# Patient Record
Sex: FEMALE | Race: WHITE | URBAN - METROPOLITAN AREA
[De-identification: names, ages, dates, MRNs, and addresses within clinical notes are randomized per-mention and may not be internally consistent; named-entity substitution may affect disease eponyms.]

---

## 2018-11-02 ENCOUNTER — OFFICE VISIT (OUTPATIENT)
Dept: PEDIATRICS | Facility: CLINIC | Age: 28
End: 2018-11-02
Payer: COMMERCIAL

## 2018-11-02 VITALS
TEMPERATURE: 98 F | WEIGHT: 125.3 LBS | BODY MASS INDEX: 20.14 KG/M2 | DIASTOLIC BLOOD PRESSURE: 72 MMHG | SYSTOLIC BLOOD PRESSURE: 114 MMHG | HEART RATE: 58 BPM | OXYGEN SATURATION: 100 % | HEIGHT: 66 IN

## 2018-11-02 DIAGNOSIS — M62.838 MUSCLE SPASMS OF NECK: Primary | ICD-10-CM

## 2018-11-02 PROCEDURE — 99203 OFFICE O/P NEW LOW 30 MIN: CPT | Performed by: INTERNAL MEDICINE

## 2018-11-02 RX ORDER — CYCLOBENZAPRINE HCL 10 MG
10 TABLET ORAL 3 TIMES DAILY PRN
Qty: 30 TABLET | Refills: 0 | Status: SHIPPED | OUTPATIENT
Start: 2018-11-02 | End: 2018-11-12

## 2018-11-02 NOTE — PROGRESS NOTES
SUBJECTIVE:   Lanette Estrada is a 28 year old female who presents to clinic today for the following health issues:    HPI  28-year-old young lady otherwise quite healthy comes in complaining of neck pain related to neck muscle spasm.  She woke up with it yesterday morning.  She has been taking ibuprofen 600 mg couple times a day.  She has iced it and even applied heat.  Massage therapy has not helped.  She indicates that this has happened to her before perhaps once every 6 months during periods of stress.  Her exams are coming up and she has been feeling stressed.  Denies any radiating pain to the upper extremity.  No tingling numbness.  No weakness of the lower extremity.  No history of trauma.    Neck Pain  Onset: 11/01/18    Description:   Location: Centralized midline pain  Radiation: Both shoulders    Intensity: moderate    Progression of Symptoms:  worsening    Accompanying Signs & Symptoms:  Burning, prickly sensation (paresthesias) in arm(s): no   Numbness in arm(s): no   Weakness in arm(s):  no   Fever: no   Headache: YES-since this am  Nausea and/or vomiting: no     History:   Trauma: no   Previous neck pain: YES  Previous surgery or injections: no   Previous Imaging (MRI,X ray): no     Precipitating factors:   Does movement increase the pain:  YES    Alleviating factors:  no    Therapies Tried and outcome:  Ibuprofen and NORCO        Problem list and histories reviewed & adjusted, as indicated.  Additional history: as documented    There is no problem list on file for this patient.    History reviewed. No pertinent surgical history.    Social History   Substance Use Topics     Smoking status: Never Smoker     Smokeless tobacco: Never Used     Alcohol use No     History reviewed. No pertinent family history.      Current Outpatient Prescriptions   Medication Sig Dispense Refill     cyclobenzaprine (FLEXERIL) 10 MG tablet Take 1 tablet (10 mg) by mouth 3 times daily as needed for muscle spasms 30  "tablet 0     Allergies   Allergen Reactions     Penicillins Hives     Sulfa Drugs Hives       Reviewed and updated as needed this visit by clinical staff  Tobacco  Allergies  Meds  Problems  Med Hx  Surg Hx  Fam Hx  Soc Hx        Reviewed and updated as needed this visit by Provider  Allergies  Meds  Problems  Med Hx  Surg Hx         ROS:  Constitutional, HEENT, cardiovascular, pulmonary, gi and gu systems are negative, except as otherwise noted.    OBJECTIVE:     /72 (BP Location: Right arm, Patient Position: Sitting, Cuff Size: Adult Regular)  Pulse 58  Temp 98  F (36.7  C) (Temporal)  Ht 5' 6.25\" (1.683 m)  Wt 125 lb 4.8 oz (56.8 kg)  SpO2 100%  BMI 20.07 kg/m2  Body mass index is 20.07 kg/(m^2).  GENERAL: healthy, alert and no distress  NECK: no adenopathy, no asymmetry, masses, or scars, thyroid normal to palpation. Spasm of the paravertebral muscles and trapezium bilateral. ROM of neck limited some in all direction from pain of muscle spasm  MS: no gross musculoskeletal defects noted, no edema    Diagnostic Test Results:  none     ASSESSMENT/PLAN:     1.  Neck muscle spasm with pain.  She will continue with ibuprofen 600 mg 3-4 times a day.  Flexeril 10 mg 3 times a day as needed prescribed as a muscle relaxer.  The symptoms should gradually self resolve in the next few days.      Magdi Call MD  [facility]  "

## 2018-11-02 NOTE — MR AVS SNAPSHOT
After Visit Summary   2018    Lanette Estrada    MRN: [id number]           Patient Information     Date Of Birth          1990        Visit Information        Provider Department      2018 1:30 PM Magdi Call MD [facility]        Today's Diagnoses     Muscle spasms of neck    -  1       Follow-ups after your visit        Follow-up notes from your care team     Return if symptoms worsen or fail to improve.      Who to contact     If you have questions or need follow up information about today's clinic visit or your schedule please contact [facility] directly at [phone].  Normal or non-critical lab and imaging results will be communicated to you by MyChart, letter or phone within 4 business days after the clinic has received the results. If you do not hear from us within 7 days, please contact the clinic through MyChart or phone. If you have a critical or abnormal lab result, we will notify you by phone as soon as possible.  Submit refill requests through [employer] or call your pharmacy and they will forward the refill request to us. Please allow 3 business days for your refill to be completed.          Additional Information About Your Visit        MyChart Information     [employer] is an electronic gateway that provides easy, online access to your medical records. With [employer], you can request a clinic appointment, read your test results, renew a prescription or communicate with your care team.     To sign up for [employer] visit the website at [email or web address]   You will be asked to enter the access code listed below, as well as some personal information. Please follow the directions to create your username and password.     Your access code is: [email or web address]  Expires: 2019  1:50 PM     Your access code will  in 90 days. If you need help or a new code, please contact your [facility] or call  "[phone] for assistance.        Care EveryWhere ID     This is your Care EveryWhere ID. This could be used by other organizations to access your [facility] medical records  [id number]        Your Vitals Were     Pulse Temperature Height Pulse Oximetry BMI (Body Mass Index)       58 98  F (36.7  C) (Temporal) 5' 6.25\" (1.683 m) 100% 20.07 kg/m2        Blood Pressure from Last 3 Encounters:   11/02/18 114/72    Weight from Last 3 Encounters:   11/02/18 125 lb 4.8 oz (56.8 kg)              Today, you had the following     No orders found for display         Today's Medication Changes          These changes are accurate as of 11/2/18  1:50 PM.  If you have any questions, ask your nurse or doctor.               Start taking these medicines.        Dose/Directions    cyclobenzaprine 10 MG tablet   Commonly known as:  FLEXERIL   Used for:  Muscle spasms of neck   Started by:  Magdi Call MD        Dose:  10 mg   Take 1 tablet (10 mg) by mouth 3 times daily as needed for muscle spasms   Quantity:  30 tablet   Refills:  0            Where to get your medicines      These medications were sent to [facility] - [place], [place] - [address], [address]  [address], [address], [facility] [place] [facility] [address]     Phone:  [phone]     cyclobenzaprine 10 MG tablet                Primary Care Provider Fax #    Physician No Ref-Primary [phone]       No address on file        Equal Access to Services     PATRICIA CALDERON AH: Lashaun jorgeo Sodaniali, waaxda luqadaha, qaybta kaalmada adeegyada, deana frankel adeeris stevenson. So [facility] [phone].    ATENCIÓN: Si habla español, tiene a [place] disposición servicios gratuitos de asistencia lingüística. Llame al [phone].    We comply with applicable federal civil rights laws and [place] laws. We do not discriminate on the basis of race, color, national origin, age, disability, sex, sexual orientation, or gender identity.            Thank " you!     Thank you for choosing [facility]  for your care. Our goal is always to provide you with excellent care. Hearing back from our patients is one way we can continue to improve our services. Please take a few minutes to complete the written survey that you may receive in the mail after your visit with us. Thank you!             Your Updated Medication List - Protect others around you: Learn how to safely use, store and throw away your medicines at www.disposemymeds.org.          This list is accurate as of 11/2/18  1:50 PM.  Always use your most recent med list.                   Brand Name Dispense Instructions for use Diagnosis    cyclobenzaprine 10 MG tablet    FLEXERIL    30 tablet    Take 1 tablet (10 mg) by mouth 3 times daily as needed for muscle spasms    Muscle spasms of neck